# Patient Record
Sex: MALE | Race: WHITE | Employment: OTHER | URBAN - METROPOLITAN AREA
[De-identification: names, ages, dates, MRNs, and addresses within clinical notes are randomized per-mention and may not be internally consistent; named-entity substitution may affect disease eponyms.]

---

## 2020-07-16 ENCOUNTER — HOSPITAL ENCOUNTER (OUTPATIENT)
Dept: MRI IMAGING | Age: 75
Discharge: HOME | End: 2020-07-16
Attending: FAMILY MEDICINE
Payer: MEDICARE

## 2020-07-16 DIAGNOSIS — F03.90 DEMENTIA (HCC): ICD-10-CM

## 2020-07-16 PROCEDURE — 70551 MRI BRAIN STEM W/O DYE: CPT

## 2020-07-22 ENCOUNTER — OFFICE VISIT (OUTPATIENT)
Dept: NEUROLOGY | Age: 75
End: 2020-07-22

## 2020-07-22 VITALS
OXYGEN SATURATION: 97 % | HEIGHT: 68 IN | DIASTOLIC BLOOD PRESSURE: 84 MMHG | HEART RATE: 93 BPM | WEIGHT: 149.4 LBS | BODY MASS INDEX: 22.64 KG/M2 | SYSTOLIC BLOOD PRESSURE: 132 MMHG

## 2020-07-22 DIAGNOSIS — I68.0 CEREBRAL AMYLOID ANGIOPATHY (CODE): Primary | ICD-10-CM

## 2020-07-22 DIAGNOSIS — R41.3 MEMORY LOSS: ICD-10-CM

## 2020-07-22 DIAGNOSIS — I10 ESSENTIAL HYPERTENSION: ICD-10-CM

## 2020-07-22 RX ORDER — ASCORBIC ACID 250 MG
TABLET ORAL
COMMUNITY

## 2020-07-22 RX ORDER — TRAZODONE HYDROCHLORIDE 50 MG/1
50 TABLET ORAL
Qty: 30 TAB | Refills: 5 | Status: SHIPPED | OUTPATIENT
Start: 2020-07-22

## 2020-07-22 RX ORDER — TRAZODONE HYDROCHLORIDE 50 MG/1
TABLET ORAL
COMMUNITY

## 2020-07-22 NOTE — PROGRESS NOTES
NEUROLOGY HISTORY AND PHYSICAL    Name Esther Grandchild Age 66 y.o. MRN [id number]  1945     Referring Physician: Lida Carroll MD      Chief Complaint:  Memory loss     This is a 66 y.o. Right handed male with a medical history of hypertension. He is sent for an abnormal MRI. He has had several years of progressive memory loss. MRI showed areas of hemosiderin deposition. Does not have a history of migraines. He has a prominent family history of dementia. Assessment and Plan  1. Cerebral amyloid angiopathy   Several areas of hemosiderin deposition  Discussed the possibility of the diagnosis as well as the progressive neurodegenerative nature of the condition as well dominant role genetics plays in transmission. MRI was reviewed independently. 2. Memory loss    - traZODone (DESYREL) 50 mg tablet; Take 1 Tab by mouth nightly. Dispense: 30 Tab; Refill: 5  - REFERRAL TO neuropsychology    3. Essential hypertension  Continue lisinopril    Discussed with family    No Known Allergies        Current Outpatient Medications   Medication Sig    traZODone (DESYREL) 50 mg tablet Take  by mouth nightly. 1/2 at bedtime    ascorbic acid, vitamin C, (Vitamin C) 250 mg tablet Take  by mouth.  lisinopril (PRINIVIL, ZESTRIL) 40 mg tablet Take 40 mg by mouth daily. Indications: HYPERTENSION    pantoprazole (PROTONIX) 40 mg tablet Take 40 mg by mouth daily. Indications: GASTROESOPHAGEAL REFLUX    naproxen (NAPROSYN) 500 mg tablet Take 500 mg by mouth two (2) times daily (with meals). For Rt knee replacement pain   Indications: PAIN    cholecalciferol, vitamin D3, (VITAMIN D3) 2,000 unit Tab Take  by mouth daily.  omega-3 fatty acids-vitamin e (FISH OIL) 1,000 mg cap Take 1 Cap by mouth daily.  calcium 500 mg Tab Take  by mouth daily.  b complex vitamins tablet Take 1 Tab by mouth daily. No current facility-administered medications for this visit.         Past Medical History:   Diagnosis Date    GERD (gastroesophageal reflux disease)     Hypertension         Social History     Tobacco Use    Smoking status: Never Smoker    Smokeless tobacco: Never Used   Substance Use Topics    Alcohol use: Yes     Alcohol/week: 3.0 standard drinks     Types: 3 Glasses of wine per week     Comment: weekly    Drug use: Not on file      Family history  Dementia  Hypertension      Review of Systems   Constitutional: Positive for weight loss. Negative for chills and fever. HENT: Positive for hearing loss. Negative for ear pain. Eyes: Negative for pain and discharge. Respiratory: Negative for cough and hemoptysis. Cardiovascular: Negative for chest pain and claudication. Gastrointestinal: Negative for constipation and diarrhea. Genitourinary: Negative for flank pain and hematuria. Musculoskeletal: Negative for back pain and myalgias. Skin: Negative for itching and rash. Neurological: Positive for speech change. Negative for headaches. Endo/Heme/Allergies: Negative for environmental allergies. Does not bruise/bleed easily. Psychiatric/Behavioral: Positive for depression and memory loss. Negative for hallucinations and suicidal ideas. The patient is nervous/anxious and has insomnia. Exam  Visit Vitals  /84   Pulse 93   Ht 5' 8\" (1.727 m)   Wt 149 lb 6.4 oz (67.8 kg)   SpO2 97%   BMI 22.72 kg/m²         General: Well developed, well nourished. Patient in no distress   Head: Normocephalic, atraumatic, anicteric sclera   Neck Normal ROM, No thyromegally   Lungs:  Clear to auscultation    Cardiac: Regular rate and rhythm with no murmurs. Abd: Bowel sounds were audible   Ext: No pedal edema   Skin: Supple no rash     NeurologicExam:  Mental Status: Alert and oriented to person plac   Speech: Fluent no aphasia or dysarthria. Cranial Nerves:   II-XII Intact    Motor:  Full and symmetric strength of upper and lower ext . Normal bulk and tone.     Reflexes:   Deep tendon reflexes 2+/4 and symmetric. Sensory:   Symmetric and intact    Gait:  Gait is balanced    Tremor:   No tremor noted. Cerebellar:  Coordination intact. Neurovascular: No carotid bruits. No JVD           MRI Results (most recent):  Results from Hospital Encounter encounter on 07/16/20   MRI BRAIN WO CONT    Narrative EXAM: MRI BRAIN WO CONT    INDICATION: Dementia    COMPARISON: None. CONTRAST: None. TECHNIQUE:    Multiplanar multisequence acquisition without contrast of the brain. FINDINGS:  Artifact limiting the evaluation anteriorly. Diffusion imaging does not show definite acute ischemic changes. There is no extra-axial fluid collection, acute hemorrhage or shift. There is prominent atrophy and nonspecific white matter changes. Low voids in major vessels at the base of the brain are seen. Multiple bilateral areas of remote hemosiderin deposition, nonspecific, can be  seen in vasculitis  No mass. Impression IMPRESSION:  1. No acute findings no mass. 2. Prominent white matter disease and atrophy. 3. Areas of hemosiderin deposition as above. Roula Shadow

## 2020-10-22 ENCOUNTER — TELEPHONE (OUTPATIENT)
Dept: NEUROLOGY | Age: 75
End: 2020-10-22

## 2021-04-26 ENCOUNTER — OFFICE VISIT (OUTPATIENT)
Dept: NEUROLOGY | Age: 76
End: 2021-04-26
Payer: MEDICARE

## 2021-04-26 VITALS
HEART RATE: 69 BPM | HEIGHT: 68 IN | OXYGEN SATURATION: 98 % | TEMPERATURE: 98.2 F | BODY MASS INDEX: 20.92 KG/M2 | WEIGHT: 138 LBS | DIASTOLIC BLOOD PRESSURE: 88 MMHG | SYSTOLIC BLOOD PRESSURE: 180 MMHG | RESPIRATION RATE: 16 BRPM

## 2021-04-26 DIAGNOSIS — G20 PARKINSON DISEASE (HCC): Primary | ICD-10-CM

## 2021-04-26 DIAGNOSIS — I68.0 CEREBRAL AMYLOID ANGIOPATHY (CODE): ICD-10-CM

## 2021-04-26 DIAGNOSIS — R41.3 MEMORY LOSS: ICD-10-CM

## 2021-04-26 DIAGNOSIS — I10 ESSENTIAL HYPERTENSION: ICD-10-CM

## 2021-04-26 PROCEDURE — G8754 DIAS BP LESS 90: HCPCS | Performed by: PSYCHIATRY & NEUROLOGY

## 2021-04-26 PROCEDURE — G8420 CALC BMI NORM PARAMETERS: HCPCS | Performed by: PSYCHIATRY & NEUROLOGY

## 2021-04-26 PROCEDURE — 1101F PT FALLS ASSESS-DOCD LE1/YR: CPT | Performed by: PSYCHIATRY & NEUROLOGY

## 2021-04-26 PROCEDURE — G8510 SCR DEP NEG, NO PLAN REQD: HCPCS | Performed by: PSYCHIATRY & NEUROLOGY

## 2021-04-26 PROCEDURE — G8427 DOCREV CUR MEDS BY ELIG CLIN: HCPCS | Performed by: PSYCHIATRY & NEUROLOGY

## 2021-04-26 PROCEDURE — G8753 SYS BP > OR = 140: HCPCS | Performed by: PSYCHIATRY & NEUROLOGY

## 2021-04-26 PROCEDURE — G8536 NO DOC ELDER MAL SCRN: HCPCS | Performed by: PSYCHIATRY & NEUROLOGY

## 2021-04-26 PROCEDURE — 99215 OFFICE O/P EST HI 40 MIN: CPT | Performed by: PSYCHIATRY & NEUROLOGY

## 2021-04-26 RX ORDER — CARBIDOPA AND LEVODOPA 25; 100 MG/1; MG/1
1 TABLET ORAL 3 TIMES DAILY
Qty: 90 TAB | Refills: 3 | Status: SHIPPED | OUTPATIENT
Start: 2021-04-26 | End: 2021-07-19 | Stop reason: SDUPTHER

## 2021-04-26 NOTE — PROGRESS NOTES
Chief Complaint   Patient presents with   [address] has had gabriella since thanksgiving of 2020

## 2021-04-26 NOTE — PROGRESS NOTES
Follow-up Visit    Name Margarita Morrell Age 66 y.o. MRN [id number]  1945       Chief Complaint: parkinsons    Patient here for follow-up visit. He has been compliant with his medications. He has no adverse reactions to the medications. His memory continues to be progressively fading but he maintains good insight and is able to process complex concepts. He was attempting to read the Bible and knows, Tamea Juliana preoccupied with the thought of having lied after reading it. He is not happy with his current medical condition and wishes it would be over however he is not suicidal.     Assesment and Plan  1. Parkinson disease ([facility] [address]  Trial of sinemet  Seems to have some elements of Parkinson's including bradykinesia, drooling but has no tremor is very little cogwheeling on exam but does have a shuffling gait. He does not have back pain or constipation. 2. Essential hypertension  continue lisinopril    3. Cerebral amyloid angiopathy  discussed    4. Memory loss  stable    Allergies  Patient has no known allergies. Medications  Current Outpatient Medications   Medication Sig    traZODone (DESYREL) 50 mg tablet Take  by mouth nightly. 1/2 at bedtime    ascorbic acid, vitamin C, (Vitamin C) 250 mg tablet Take  by mouth.  traZODone (DESYREL) 50 mg tablet Take 1 Tab by mouth nightly.  lisinopril (PRINIVIL, ZESTRIL) 40 mg tablet Take 40 mg by mouth daily. Indications: HYPERTENSION    pantoprazole (PROTONIX) 40 mg tablet Take 40 mg by mouth daily. Indications: GASTROESOPHAGEAL REFLUX    naproxen (NAPROSYN) 500 mg tablet Take 500 mg by mouth two (2) times daily (with meals). For Rt knee replacement pain   Indications: PAIN    cholecalciferol, vitamin D3, (VITAMIN D3) 2,000 unit Tab Take  by mouth daily.  omega-3 fatty acids-vitamin e (FISH OIL) 1,000 mg cap Take 1 Cap by mouth daily.  calcium 500 mg Tab Take  by mouth daily.  b complex vitamins tablet Take 1 Tab by mouth daily. No current facility-administered medications for this visit. Medical History  Past Medical History:   Diagnosis Date    GERD (gastroesophageal reflux disease)     Hypertension        Review of Systems   Constitutional: Negative for chills and fever. HENT: Negative for ear pain. Eyes: Negative for pain and discharge. Respiratory: Negative for cough and hemoptysis. Cardiovascular: Negative for chest pain and claudication. Gastrointestinal: Negative for constipation and diarrhea. Genitourinary: Negative for flank pain and hematuria. Musculoskeletal: Negative for back pain and myalgias. Skin: Negative for itching and rash. Neurological: Positive for weakness. Negative for headaches. Endo/Heme/Allergies: Negative for environmental allergies. Does not bruise/bleed easily. Psychiatric/Behavioral: Positive for depression and memory loss. Negative for hallucinations. Exam:  Visit Vitals  BP (!) 180/88 (BP 1 Location: Left upper arm, BP Patient Position: Sitting, BP Cuff Size: Adult)   Pulse 69   Temp 98.2 °F (36.8 °C) (Temporal)   Resp 16   Ht 5' 8\" (1.727 m)   Wt 138 lb (62.6 kg)   SpO2 98%   BMI 20.98 kg/m²        General: Well developed, well nourished. Patient in no apparent distress   Head: Normocephalic, atraumatic, anicteric sclera   Neck Normal ROM, No thyromegally   Lungs:  Clear to auscultation    Cardiac: Regular rate and rhythm with no murmurs. Abd: Bowel sounds were audible. Ext: No pedal edema   Skin: Supple no rash     NeurologicExam:  Mental Status: Alert and oriented to person and place    Speech: Fluent no aphasia or dysarthria. Hypophonic   Cranial Nerves:  II - XII Intact   Motor:  Full and symmetric strength of upper and lower extremities. Normal bulk and tone. Bradykinetic   Reflexes:   Deep tendon reflexes 2+/4 and symmetric. Sensory:   Symmetric and intact with no perceived deficits . Gait:   Shuffling uses a walker. Tremor:   No tremor noted. Cerebellar:  Coordination intact. Neurovascular: No carotid bruits.  No JVD      45 minutes spent more than 50% spent in chart review and face to face  examination, discussion of treatment options and approach

## 2021-05-06 ENCOUNTER — HOSPITAL ENCOUNTER (OUTPATIENT)
Dept: CT IMAGING | Age: 76
Discharge: HOME | End: 2021-05-06
Attending: PSYCHIATRY & NEUROLOGY
Payer: MEDICARE

## 2021-05-06 DIAGNOSIS — I68.0 CEREBRAL AMYLOID ANGIOPATHY (CODE): ICD-10-CM

## 2021-05-06 PROCEDURE — 70450 CT HEAD/BRAIN W/O DYE: CPT

## 2021-07-19 DIAGNOSIS — G20 PARKINSON DISEASE (HCC): ICD-10-CM

## 2021-07-19 RX ORDER — CARBIDOPA AND LEVODOPA 25; 100 MG/1; MG/1
TABLET ORAL
Qty: 270 TABLET | Refills: 1 | Status: SHIPPED | OUTPATIENT
Start: 2021-07-19 | End: 2022-04-22 | Stop reason: SDUPTHER

## 2022-04-22 ENCOUNTER — OFFICE VISIT (OUTPATIENT)
Dept: NEUROLOGY | Age: 77
End: 2022-04-22
Payer: MEDICARE

## 2022-04-22 VITALS
TEMPERATURE: 98.3 F | HEART RATE: 90 BPM | SYSTOLIC BLOOD PRESSURE: 90 MMHG | DIASTOLIC BLOOD PRESSURE: 60 MMHG | OXYGEN SATURATION: 98 % | WEIGHT: 135 LBS | RESPIRATION RATE: 18 BRPM | BODY MASS INDEX: 20.53 KG/M2

## 2022-04-22 DIAGNOSIS — R41.3 DISTURBANCE OF MEMORY: ICD-10-CM

## 2022-04-22 DIAGNOSIS — G20 PARKINSON'S DISEASE (TREMOR, STIFFNESS, SLOW MOTION, UNSTABLE POSTURE) (HCC): Primary | ICD-10-CM

## 2022-04-22 DIAGNOSIS — G20 PARKINSON DISEASE (HCC): ICD-10-CM

## 2022-04-22 PROCEDURE — G8510 SCR DEP NEG, NO PLAN REQD: HCPCS | Performed by: PSYCHIATRY & NEUROLOGY

## 2022-04-22 PROCEDURE — G8536 NO DOC ELDER MAL SCRN: HCPCS | Performed by: PSYCHIATRY & NEUROLOGY

## 2022-04-22 PROCEDURE — G8427 DOCREV CUR MEDS BY ELIG CLIN: HCPCS | Performed by: PSYCHIATRY & NEUROLOGY

## 2022-04-22 PROCEDURE — 1101F PT FALLS ASSESS-DOCD LE1/YR: CPT | Performed by: PSYCHIATRY & NEUROLOGY

## 2022-04-22 PROCEDURE — G8420 CALC BMI NORM PARAMETERS: HCPCS | Performed by: PSYCHIATRY & NEUROLOGY

## 2022-04-22 PROCEDURE — 99215 OFFICE O/P EST HI 40 MIN: CPT | Performed by: PSYCHIATRY & NEUROLOGY

## 2022-04-22 RX ORDER — CARBIDOPA AND LEVODOPA 25; 100 MG/1; MG/1
1 TABLET ORAL 3 TIMES DAILY
Qty: 270 TABLET | Refills: 5 | Status: SHIPPED | OUTPATIENT
Start: 2022-04-22

## 2022-04-22 RX ORDER — AMANTADINE HYDROCHLORIDE 100 MG/1
50 TABLET ORAL
Qty: 100 TABLET | Refills: 5 | Status: SHIPPED | OUTPATIENT
Start: 2022-04-22

## 2022-04-22 NOTE — PROGRESS NOTES
Consult  REFERRED BY:  Elier Mcghee MD    CHIEF COMPLAINT: Possible Parkinson's disease and nausea and not eating. Subjective: Christopher Palomino is a 64 y.o. right-handed  male we are seeing as a new patient to me, at the request of Dr. Pricila Shelton for new problem of complaining of nausea and not able to eat, and having no appetite. He just saw his PCP 2 weeks ago and nothing was sent about all these problems. He was seen by a neurologist a year ago, and diagnosed with possible Parkinson's disease and started on Sinemet 25/100 mg 3 times a day is taking that medication now without much other side effect. He complains of drooling or his family member does, and they want to know what they can do about it. It might be part of his Parkinson's disease. There was a question about his diagnosis, but on exam today, he clearly has cogwheeling and rigidity on the right side greater than the left and seems to be clearly parkinsonian with some very minimal rest tremor bilaterally. He seems to move his extremities uniformly, and does not have any decreased arm swing but has to walk with a walker due to his generalized weakness. He has mild dementia and cannot give the date. He is already on trazodone 25 mg at bedtime, takes a vitamin C, takes a multivitamin, and takes a vitamin D every day. We advised his family member to give him boost or Ensure, and give him a flavor that he likes, and he says he likes chocolate so I said to try with that. He also takes Protonix for stomach and Zestril for his blood pressure. He has never had a clear history of stroke or TIA or head injury or trauma or neuroleptic exposure or other causes for his symptoms. He had an MRI scan done in 2020 that showed mild atrophy and white matter disease, but no other structural lesion. Supposedly his PCP has checked his thyroid and B12 level.   We told the patient he has to try to eat more, and he should monitor his weight and get back with his PCP if he still complains of nausea and no appetite. He is to continue his Sinemet, I do not think he needs an increase at this time, as his parkinsonian symptoms are mild and I do not want to aggravate his GI symptoms. Because of his persistent drooling his family member wants something done, so we will try him on low-dose amantadine, only 50 mg a day, and I do not want to give him too much else, because I do not want to make him more confused or hallucinating, is already got memory problems and may be a candidate for cognitive enhancing agents in the future. We encouraged him to try to stay active, exercise more, stay mentally and physically active. He is to keep track of his weight and call us if there is any change in interim, we will see him back again in 6 months time or earlier as needed. We refilled his medications for him and tried him on the amantadine as above. Past Medical History:   Diagnosis Date    GERD (gastroesophageal reflux disease)     Hypertension       Past Surgical History:   Procedure Laterality Date    HX ORTHOPAEDIC  1/10    Rt total knee replacement     No family history on file. Social History     Tobacco Use    Smoking status: Never Smoker    Smokeless tobacco: Never Used   Substance Use Topics    Alcohol use: Yes     Alcohol/week: 3.0 standard drinks     Types: 3 Glasses of wine per week     Comment: weekly         Current Outpatient Medications:     amantadine HCL (SYMMETREL) 100 mg tablet, Take 0.5 Tablets by mouth daily (with breakfast). , Disp: 100 Tablet, Rfl: 5    carbidopa-levodopa (SINEMET)  mg per tablet, Take 1 Tablet by mouth three (3) times daily. , Disp: 270 Tablet, Rfl: 5    traZODone (DESYREL) 50 mg tablet, Take  by mouth nightly.  1/2 at bedtime, Disp: , Rfl:     ascorbic acid, vitamin C, (Vitamin C) 250 mg tablet, Take  by mouth., Disp: , Rfl:     traZODone (DESYREL) 50 mg tablet, Take 1 Tab by mouth nightly., Disp: 30 Tab, Rfl: 5    lisinopril (PRINIVIL, ZESTRIL) 40 mg tablet, Take 40 mg by mouth daily. Indications: HYPERTENSION, Disp: , Rfl:     pantoprazole (PROTONIX) 40 mg tablet, Take 40 mg by mouth daily. Indications: GASTROESOPHAGEAL REFLUX, Disp: , Rfl:     naproxen (NAPROSYN) 500 mg tablet, Take 500 mg by mouth two (2) times daily (with meals). For Rt knee replacement pain   Indications: PAIN, Disp: , Rfl:     cholecalciferol, vitamin D3, (VITAMIN D3) 2,000 unit Tab, Take  by mouth daily. , Disp: , Rfl:     omega-3 fatty acids-vitamin e (FISH OIL) 1,000 mg cap, Take 1 Cap by mouth daily. , Disp: , Rfl:     calcium 500 mg Tab, Take  by mouth daily. , Disp: , Rfl:     b complex vitamins tablet, Take 1 Tab by mouth daily. , Disp: , Rfl:         No Known Allergies   MRI Results (most recent):  Results from Hospital Encounter encounter on 07/16/20    MRI BRAIN WO CONT    Narrative  EXAM: MRI BRAIN WO CONT    INDICATION: Dementia    COMPARISON: None. CONTRAST: None. TECHNIQUE:  Multiplanar multisequence acquisition without contrast of the brain. FINDINGS:  Artifact limiting the evaluation anteriorly. Diffusion imaging does not show definite acute ischemic changes. There is no extra-axial fluid collection, acute hemorrhage or shift. There is prominent atrophy and nonspecific white matter changes. Low voids in major vessels at the base of the brain are seen. Multiple bilateral areas of remote hemosiderin deposition, nonspecific, can be  seen in vasculitis  No mass. Impression  IMPRESSION:  1. No acute findings no mass. 2. Prominent white matter disease and atrophy. 3. Areas of hemosiderin deposition as above. Serjio Richardson Results from [place] encounter on 07/16/20    MRI BRAIN WO CONT    Narrative  EXAM: MRI BRAIN WO CONT    INDICATION: Dementia    COMPARISON: None. CONTRAST: None. TECHNIQUE:  Multiplanar multisequence acquisition without contrast of the brain.     FINDINGS:  Artifact limiting the evaluation anteriorly. Diffusion imaging does not show definite acute ischemic changes. There is no extra-axial fluid collection, acute hemorrhage or shift. There is prominent atrophy and nonspecific white matter changes. Low voids in major vessels at the base of the brain are seen. Multiple bilateral areas of remote hemosiderin deposition, nonspecific, can be  seen in vasculitis  No mass. Impression  IMPRESSION:  1. No acute findings no mass. 2. Prominent white matter disease and atrophy. 3. Areas of hemosiderin deposition as above. .    Review of Systems:  A comprehensive review of systems was negative except for: Constitutional: positive for fatigue, malaise, anorexia and weight loss  Gastrointestinal: positive for dysphagia, dyspepsia, reflux symptoms, nausea and abdominal pain  Musculoskeletal: positive for myalgias, arthralgias, stiff joints and muscle weakness  Neurological: positive for memory problems, coordination problems, gait problems, tremor and weakness  Behvioral/Psych: positive for anxiety and depression   Vitals:    04/22/22 1530 04/22/22 1547   BP: (!) 86/54 90/60   Pulse: 97 90   Resp: 18    Temp: 98.3 °F (36.8 °C)    TempSrc: Temporal    SpO2: 98%    Weight: 135 lb (61.2 kg)      Objective:     I      NEUROLOGICAL EXAM:    Appearance: The patient is poorly developed and nourished, provides a poor history and is in no acute distress. Mental Status: Oriented to place and person, and the president, and knows his age and date of birth, but is a very poor historian cognitive function is abnormal and speech is fluent and no aphasia and has mild dysarthria. Mood and affect appropriate but very depressed. Cranial Nerves:   Intact visual fields. Fundi are benign, disc are flat, no lesions seen on funduscopy. MAX, EOM's full, no nystagmus, no ptosis. Facial sensation is normal. Corneal reflexes are not tested. Facial movement is symmetric. Hearing is abnormal bilaterally.  Palate is midline with normal sternocleidomastoid and trapezius muscles are normal. Tongue is midline. Neck without meningismus or bruits  Temporal arteries are not tender or enlarged  TMJ areas are not tender on palpation   Motor:  4/5 strength in upper and lower proximal and distal muscles. Reduced bulk and increased tone with cogwheeling and rigidity and bradykinesia in both upper extremities and increased tone. No fasciculations. Rapid alternating movement is symmetric and slow bilaterally   Reflexes:   Deep tendon reflexes 1+/4 and symmetrical.  No babinski or clonus present   Sensory:   Normal to touch, pinprick and vibration and temperature mildly decreased in both feet. DSS is intact   Gait:  Abnormal gait for patient's age as he has to walk with a walker and moves slowly with a wide-based gait. Tremor:    Mild resting tremor noted bilaterally. Cerebellar:  Abnormal cerebellar signs present on Romberg and tandem testing and finger-nose-finger exam shows mild dysmetria. Neurovascular:  Normal heart sounds and regular rhythm, peripheral pulses decreased, and no carotid bruits. Assessment:       ICD-10-CM ICD-9-CM    1. Parkinson's disease (tremor, stiffness, slow motion, unstable posture) ([facility])  G20 332.0 amantadine HCL (SYMMETREL) 100 mg tablet      carbidopa-levodopa (SINEMET)  mg per tablet   2. Parkinson disease ([facility] [address]  G20 332.0 amantadine HCL (SYMMETREL) 100 mg tablet      carbidopa-levodopa (SINEMET)  mg per tablet   3. Disturbance of memory  R41.3 780.93 amantadine HCL (SYMMETREL) 100 mg tablet      carbidopa-levodopa (SINEMET)  mg per tablet     Active Problems:    * No active hospital problems. *      Plan:     Patient with Parkinson's disease, and seems to clearly have that disease, he will continue the Sinemet at 3 times a day, because his symptoms are mild now and fairly well controlled on that medication.   We will give him amantadine to try for his excessive drooling and for his Parkinson's disease, but the family member was asked to watch closely for any increased confusion or agitation and to call us immediately and we will probably have to stop that. Other alternative might be Robinul, but that may also have adverse anticholinergic effects. He is to check with his PCP about his weight loss and try to get some Ensure or boost and start drinking more of that to keep his weight up and try to exercise and stay physically mentally active. We will see him again in 3 months time, and 38 minutes spent with the patient and his family member current with his history, reviewing his chart, reviewing his MRI scans, reviewing his records in detail, and discussing his diagnosis prognosis and treatment options with the family and the patient in detail. 38 minutes met with the patient and his family member went over all this in detail with them.     Signed By: Tracee Tejada MD     April 22, 2022       CC: Dayday Vazquez MD  FAX: [phone]

## 2022-04-22 NOTE — LETTER
4/22/2022    Patient: Johann Becker   YOB: 1945   Date of Visit: 4/22/2022     Susannah Rivera MD  [address] [address]  [address] [id number]  Via Fax: [phone]    Dear Susannah Rivera MD,      Thank you for referring Mr. Johann Becker to [address] [place] [address] for evaluation. My notes for this consultation are attached. Consult  REFERRED BY:  Siddharth Herring MD    CHIEF COMPLAINT: Possible Parkinson's disease and nausea and not eating. Subjective: Johann Becker is a 64 y.o. right-handed  male we are seeing as a new patient to me, at the request of Dr. Nu Blunt for new problem of complaining of nausea and not able to eat, and having no appetite. He just saw his PCP 2 weeks ago and nothing was sent about all these problems. He was seen by a neurologist a year ago, and diagnosed with possible Parkinson's disease and started on Sinemet 25/100 mg 3 times a day is taking that medication now without much other side effect. He complains of drooling or his family member does, and they want to know what they can do about it. It might be part of his Parkinson's disease. There was a question about his diagnosis, but on exam today, he clearly has cogwheeling and rigidity on the right side greater than the left and seems to be clearly parkinsonian with some very minimal rest tremor bilaterally. He seems to move his extremities uniformly, and does not have any decreased arm swing but has to walk with a walker due to his generalized weakness. He has mild dementia and cannot give the date. He is already on trazodone 25 mg at bedtime, takes a vitamin C, takes a multivitamin, and takes a vitamin D every day. We advised his family member to give him boost or Ensure, and give him a flavor that he likes, and he says he likes chocolate so I said to try with that. He also takes Protonix for stomach and Zestril for his blood pressure.   He has never had a clear history of stroke or TIA or head injury or trauma or neuroleptic exposure or other causes for his symptoms. He had an MRI scan done in 2020 that showed mild atrophy and white matter disease, but no other structural lesion. Supposedly his PCP has checked his thyroid and B12 level. We told the patient he has to try to eat more, and he should monitor his weight and get back with his PCP if he still complains of nausea and no appetite. He is to continue his Sinemet, I do not think he needs an increase at this time, as his parkinsonian symptoms are mild and I do not want to aggravate his GI symptoms. Because of his persistent drooling his family member wants something done, so we will try him on low-dose amantadine, only 50 mg a day, and I do not want to give him too much else, because I do not want to make him more confused or hallucinating, is already got memory problems and may be a candidate for cognitive enhancing agents in the future. We encouraged him to try to stay active, exercise more, stay mentally and physically active. He is to keep track of his weight and call us if there is any change in interim, we will see him back again in 6 months time or earlier as needed. We refilled his medications for him and tried him on the amantadine as above. Past Medical History:   Diagnosis Date    GERD (gastroesophageal reflux disease)     Hypertension       Past Surgical History:   Procedure Laterality Date    HX ORTHOPAEDIC  1/10    Rt total knee replacement     No family history on file. Social History     Tobacco Use    Smoking status: Never Smoker    Smokeless tobacco: Never Used   Substance Use Topics    Alcohol use: Yes     Alcohol/week: 3.0 standard drinks     Types: 3 Glasses of wine per week     Comment: weekly         Current Outpatient Medications:     amantadine HCL (SYMMETREL) 100 mg tablet, Take 0.5 Tablets by mouth daily (with breakfast). , Disp: 100 Tablet, Rfl: 5   carbidopa-levodopa (SINEMET)  mg per tablet, Take 1 Tablet by mouth three (3) times daily. , Disp: 270 Tablet, Rfl: 5    traZODone (DESYREL) 50 mg tablet, Take  by mouth nightly. 1/2 at bedtime, Disp: , Rfl:     ascorbic acid, vitamin C, (Vitamin C) 250 mg tablet, Take  by mouth., Disp: , Rfl:     traZODone (DESYREL) 50 mg tablet, Take 1 Tab by mouth nightly., Disp: 30 Tab, Rfl: 5    lisinopril (PRINIVIL, ZESTRIL) 40 mg tablet, Take 40 mg by mouth daily. Indications: HYPERTENSION, Disp: , Rfl:     pantoprazole (PROTONIX) 40 mg tablet, Take 40 mg by mouth daily. Indications: GASTROESOPHAGEAL REFLUX, Disp: , Rfl:     naproxen (NAPROSYN) 500 mg tablet, Take 500 mg by mouth two (2) times daily (with meals). For Rt knee replacement pain   Indications: PAIN, Disp: , Rfl:     cholecalciferol, vitamin D3, (VITAMIN D3) 2,000 unit Tab, Take  by mouth daily. , Disp: , Rfl:     omega-3 fatty acids-vitamin e (FISH OIL) 1,000 mg cap, Take 1 Cap by mouth daily. , Disp: , Rfl:     calcium 500 mg Tab, Take  by mouth daily. , Disp: , Rfl:     b complex vitamins tablet, Take 1 Tab by mouth daily. , Disp: , Rfl:         No Known Allergies   MRI Results (most recent):  Results from Hospital Encounter encounter on 07/16/20    MRI BRAIN WO CONT    Narrative  EXAM: MRI BRAIN WO CONT    INDICATION: Dementia    COMPARISON: None. CONTRAST: None. TECHNIQUE:  Multiplanar multisequence acquisition without contrast of the brain. FINDINGS:  Artifact limiting the evaluation anteriorly. Diffusion imaging does not show definite acute ischemic changes. There is no extra-axial fluid collection, acute hemorrhage or shift. There is prominent atrophy and nonspecific white matter changes. Low voids in major vessels at the base of the brain are seen. Multiple bilateral areas of remote hemosiderin deposition, nonspecific, can be  seen in vasculitis  No mass. Impression  IMPRESSION:  1. No acute findings no mass.   2. Prominent white matter disease and atrophy. 3. Areas of hemosiderin deposition as above. Milad Shen Results from [facility] encounter on 07/16/20    MRI BRAIN WO CONT    Narrative  EXAM: MRI BRAIN WO CONT    INDICATION: Dementia    COMPARISON: None. CONTRAST: None. TECHNIQUE:  Multiplanar multisequence acquisition without contrast of the brain. FINDINGS:  Artifact limiting the evaluation anteriorly. Diffusion imaging does not show definite acute ischemic changes. There is no extra-axial fluid collection, acute hemorrhage or shift. There is prominent atrophy and nonspecific white matter changes. Low voids in major vessels at the base of the brain are seen. Multiple bilateral areas of remote hemosiderin deposition, nonspecific, can be  seen in vasculitis  No mass. Impression  IMPRESSION:  1. No acute findings no mass. 2. Prominent white matter disease and atrophy. 3. Areas of hemosiderin deposition as above. .    Review of Systems:  A comprehensive review of systems was negative except for: Constitutional: positive for fatigue, malaise, anorexia and weight loss  Gastrointestinal: positive for dysphagia, dyspepsia, reflux symptoms, nausea and abdominal pain  Musculoskeletal: positive for myalgias, arthralgias, stiff joints and muscle weakness  Neurological: positive for memory problems, coordination problems, gait problems, tremor and weakness  Behvioral/Psych: positive for anxiety and depression   Vitals:    04/22/22 1530 04/22/22 1547   BP: (!) 86/54 90/60   Pulse: 97 90   Resp: 18    Temp: 98.3 °F (36.8 °C)    TempSrc: Temporal    SpO2: 98%    Weight: 135 lb (61.2 kg)      Objective:     I      NEUROLOGICAL EXAM:    Appearance: The patient is poorly developed and nourished, provides a poor history and is in no acute distress.    Mental Status: Oriented to place and person, and the president, and knows his age and date of birth, but is a very poor historian cognitive function is abnormal and speech is fluent and no aphasia and has mild dysarthria. Mood and affect appropriate but very depressed. Cranial Nerves:   Intact visual fields. Fundi are benign, disc are flat, no lesions seen on funduscopy. MAX, EOM's full, no nystagmus, no ptosis. Facial sensation is normal. Corneal reflexes are not tested. Facial movement is symmetric. Hearing is abnormal bilaterally. Palate is midline with normal sternocleidomastoid and trapezius muscles are normal. Tongue is midline. Neck without meningismus or bruits  Temporal arteries are not tender or enlarged  TMJ areas are not tender on palpation   Motor:  4/5 strength in upper and lower proximal and distal muscles. Reduced bulk and increased tone with cogwheeling and rigidity and bradykinesia in both upper extremities and increased tone. No fasciculations. Rapid alternating movement is symmetric and slow bilaterally   Reflexes:   Deep tendon reflexes 1+/4 and symmetrical.  No babinski or clonus present   Sensory:   Normal to touch, pinprick and vibration and temperature mildly decreased in both feet. DSS is intact   Gait:  Abnormal gait for patient's age as he has to walk with a walker and moves slowly with a wide-based gait. Tremor:    Mild resting tremor noted bilaterally. Cerebellar:  Abnormal cerebellar signs present on Romberg and tandem testing and finger-nose-finger exam shows mild dysmetria. Neurovascular:  Normal heart sounds and regular rhythm, peripheral pulses decreased, and no carotid bruits. Assessment:       ICD-10-CM ICD-9-CM    1. Parkinson's disease (tremor, stiffness, slow motion, unstable posture) ([facility])  G20 332.0 amantadine HCL (SYMMETREL) 100 mg tablet      carbidopa-levodopa (SINEMET)  mg per tablet   2. [facility]  G20 332.0 amantadine HCL (SYMMETREL) 100 mg tablet      carbidopa-levodopa (SINEMET)  mg per tablet   3.  Disturbance of memory  R41.3 780.93 amantadine HCL (SYMMETREL) 100 mg tablet carbidopa-levodopa (SINEMET)  mg per tablet     Active Problems:    * No active hospital problems. *      Plan:     Patient with Parkinson's disease, and seems to clearly have that disease, he will continue the Sinemet at 3 times a day, because his symptoms are mild now and fairly well controlled on that medication. We will give him amantadine to try for his excessive drooling and for his Parkinson's disease, but the family member was asked to watch closely for any increased confusion or agitation and to call us immediately and we will probably have to stop that. Other alternative might be Robinul, but that may also have adverse anticholinergic effects. He is to check with his PCP about his weight loss and try to get some Ensure or boost and start drinking more of that to keep his weight up and try to exercise and stay physically mentally active. We will see him again in 3 months time, and 34 minutes spent with the patient and his family member current with his history, reviewing his chart, reviewing his MRI scans, reviewing his records in detail, and discussing his diagnosis prognosis and treatment options with the family and the patient in detail. Signed By: Ean Bryan MD     April 22, 2022       CC: Manuel Gutierrez MD  FAX: [phone]        If you have questions, please do not hesitate to call me. I look forward to following your patient along with you.       Sincerely,    Ean Bryan MD

## 2023-07-10 RX ORDER — AMANTADINE HYDROCHLORIDE 100 MG/1
TABLET ORAL
Qty: 45 TABLET | Refills: 28 | Status: SHIPPED | OUTPATIENT
Start: 2023-07-10